# Patient Record
Sex: MALE | Race: BLACK OR AFRICAN AMERICAN | NOT HISPANIC OR LATINO | Employment: UNEMPLOYED | URBAN - METROPOLITAN AREA
[De-identification: names, ages, dates, MRNs, and addresses within clinical notes are randomized per-mention and may not be internally consistent; named-entity substitution may affect disease eponyms.]

---

## 2018-03-02 PROCEDURE — 99283 EMERGENCY DEPT VISIT LOW MDM: CPT

## 2018-03-03 ENCOUNTER — HOSPITAL ENCOUNTER (EMERGENCY)
Facility: OTHER | Age: 38
Discharge: HOME | End: 2018-03-03
Attending: INTERNAL MEDICINE
Payer: MEDICAID

## 2018-03-03 VITALS
BODY MASS INDEX: 21 KG/M2 | RESPIRATION RATE: 16 BRPM | TEMPERATURE: 99 F | SYSTOLIC BLOOD PRESSURE: 112 MMHG | DIASTOLIC BLOOD PRESSURE: 68 MMHG | HEART RATE: 59 BPM | HEIGHT: 71 IN | OXYGEN SATURATION: 100 % | WEIGHT: 150 LBS

## 2018-03-03 DIAGNOSIS — M79.641 RIGHT HAND PAIN: Primary | ICD-10-CM

## 2018-03-03 DIAGNOSIS — M79.643 HAND PAIN: ICD-10-CM

## 2018-03-03 RX ORDER — IBUPROFEN 800 MG/1
800 TABLET ORAL EVERY 8 HOURS PRN
Qty: 30 TABLET | Refills: 0 | Status: SHIPPED | OUTPATIENT
Start: 2018-03-03

## 2018-03-03 NOTE — ED PROVIDER NOTES
"Encounter Date: 3/2/2018       History     Chief Complaint   Patient presents with    Hand Pain     "i think i have a chemical infection in my hand" pt aao3, rr even unalbored skin wdp cap refill <2 sec. pt c/o pain to right hand after using tire/rim , unsure of how long pain to hand. no open wounds or abrasions.     38-year-old male presents to the emergency department complaining of right hand pain since using a tire  yesterday.  He states the tire  caused his right hand to become tender.  Denies fevers/chills, nausea/vomiting.      The history is provided by the patient. No  was used.   Hand Injury    The incident occurred yesterday. The incident occurred at home. The pain is present in the right hand. The quality of the pain is described as burning. The pain is at a severity of 2/10. The pain has been fluctuating since the incident. Pertinent negatives include no fever. He reports no foreign bodies present. The symptoms are aggravated by palpation. He has tried nothing for the symptoms.     Review of patient's allergies indicates:  No Known Allergies  History reviewed. No pertinent past medical history.  History reviewed. No pertinent surgical history.  History reviewed. No pertinent family history.  Social History   Substance Use Topics    Smoking status: Current Every Day Smoker     Packs/day: 0.50     Types: Cigarettes    Smokeless tobacco: Not on file    Alcohol use Yes      Comment: occassional     Review of Systems   Constitutional: Negative for fever.   Musculoskeletal:        Right hand pain   All other systems reviewed and are negative.      Physical Exam     Initial Vitals [03/02/18 2350]   BP Pulse Resp Temp SpO2   118/77 60 16 98.7 °F (37.1 °C) 99 %      MAP       90.67         Physical Exam    Nursing note and vitals reviewed.  Constitutional: He appears well-developed and well-nourished. No distress.   HENT:   Head: Normocephalic and atraumatic.   Eyes: " EOM are normal.   Neck: Normal range of motion.   Cardiovascular: Normal rate, regular rhythm and intact distal pulses.   Pulmonary/Chest: Breath sounds normal.   Abdominal: He exhibits no distension.   Musculoskeletal: He exhibits no edema.   Neurological: He is alert.   Skin: Skin is warm and dry. No rash noted. No erythema.   Right hand reveals no signs of erythema or edema.  Slight tenderness to palpation without gross deformity.  Neurovascularly intact   Psychiatric: He has a normal mood and affect.         ED Course   Procedures  Labs Reviewed - No data to display          Medical Decision Making:   Initial Assessment:   38-year-old male presents to the emergency department complaining of right hand pain since using a tire  yesterday.  He states the tire  caused his right hand to become tender.  Denies fevers/chills, nausea/vomiting.    ED Management:  Patient was given instructions for right hand pain secondary to chemical exposure.  Physical exam revealed no visible signs of dermatitis, cellulitis or abscess formation.  Patient was given a prescription for ibuprofen and advised to follow-up with his primary care physician within the next 3 days for reevaluation.                      Clinical Impression:   The primary encounter diagnosis was Right hand pain. A diagnosis of Hand pain was also pertinent to this visit.    Disposition:   Disposition: Discharged  Condition: Stable                        Romulo Baez MD  03/03/18 0040

## 2019-02-04 ENCOUNTER — HOSPITAL ENCOUNTER (EMERGENCY)
Facility: HOSPITAL | Age: 39
Discharge: HOME | End: 2019-02-04
Attending: EMERGENCY MEDICINE
Payer: MEDICAID

## 2019-02-04 VITALS
DIASTOLIC BLOOD PRESSURE: 74 MMHG | OXYGEN SATURATION: 100 % | BODY MASS INDEX: 22.4 KG/M2 | HEIGHT: 71 IN | TEMPERATURE: 98 F | RESPIRATION RATE: 18 BRPM | SYSTOLIC BLOOD PRESSURE: 126 MMHG | WEIGHT: 160 LBS | HEART RATE: 69 BPM

## 2019-02-04 DIAGNOSIS — R30.0 DYSURIA: Primary | ICD-10-CM

## 2019-02-04 LAB
BILIRUBIN, POC UA: NEGATIVE
BLOOD, POC UA: NEGATIVE
CLARITY, POC UA: CLEAR
COLOR, POC UA: YELLOW
GLUCOSE, POC UA: NEGATIVE
KETONES, POC UA: NEGATIVE
LEUKOCYTE EST, POC UA: NEGATIVE
NITRITE, POC UA: NEGATIVE
PH UR STRIP: 7 [PH]
PROTEIN, POC UA: ABNORMAL
SPECIFIC GRAVITY, POC UA: 1.02
UROBILINOGEN, POC UA: 4 E.U./DL

## 2019-02-04 PROCEDURE — 99284 EMERGENCY DEPT VISIT MOD MDM: CPT | Mod: 25,ER

## 2019-02-04 PROCEDURE — 81003 URINALYSIS AUTO W/O SCOPE: CPT | Mod: ER

## 2019-02-04 PROCEDURE — 63600175 PHARM REV CODE 636 W HCPCS: Mod: ER | Performed by: EMERGENCY MEDICINE

## 2019-02-04 PROCEDURE — 25000003 PHARM REV CODE 250: Mod: ER | Performed by: EMERGENCY MEDICINE

## 2019-02-04 PROCEDURE — 96372 THER/PROPH/DIAG INJ SC/IM: CPT | Mod: ER

## 2019-02-04 PROCEDURE — 87491 CHLMYD TRACH DNA AMP PROBE: CPT

## 2019-02-04 RX ORDER — CEFTRIAXONE 250 MG/1
250 INJECTION, POWDER, FOR SOLUTION INTRAMUSCULAR; INTRAVENOUS
Status: COMPLETED | OUTPATIENT
Start: 2019-02-04 | End: 2019-02-04

## 2019-02-04 RX ORDER — AZITHROMYCIN 250 MG/1
1000 TABLET, FILM COATED ORAL
Status: COMPLETED | OUTPATIENT
Start: 2019-02-04 | End: 2019-02-04

## 2019-02-04 RX ORDER — PHENAZOPYRIDINE HYDROCHLORIDE 200 MG/1
200 TABLET, FILM COATED ORAL 3 TIMES DAILY PRN
Qty: 6 TABLET | Refills: 0 | Status: SHIPPED | OUTPATIENT
Start: 2019-02-04 | End: 2019-02-06

## 2019-02-04 RX ADMIN — CEFTRIAXONE SODIUM 250 MG: 250 INJECTION, POWDER, FOR SOLUTION INTRAMUSCULAR; INTRAVENOUS at 03:02

## 2019-02-04 RX ADMIN — AZITHROMYCIN 1000 MG: 250 TABLET, FILM COATED ORAL at 03:02

## 2019-02-04 NOTE — DISCHARGE INSTRUCTIONS
Cultures pending for gonorrhea and Chlamydia.  Please follow up with primary care for further STD evaluation to include HIV, syphilis, and herpes.

## 2019-02-04 NOTE — ED PROVIDER NOTES
Encounter Date: 2/4/2019    SCRIBE #1 NOTE: I, Milagros Garcia, am scribing for, and in the presence of,  Dr. Curiel. I have scribed the following portions of the note - Other sections scribed: HPI,ROS,PE .       History     Chief Complaint   Patient presents with    Urinary Tract Infection     pt reports burning on urination for 1 day, denies discharge     37 y/o/m presents with mild dysuria 2 days ago. Pain getting worse.  No penile discharge, fever, chills, back pain. He admits to having unprotected intercourse last week with a female he does not know.      The history is provided by the patient. No  was used.     Review of patient's allergies indicates:  No Known Allergies  History reviewed. No pertinent past medical history.  History reviewed. No pertinent surgical history.  History reviewed. No pertinent family history.  Social History     Tobacco Use    Smoking status: Current Every Day Smoker     Packs/day: 0.50     Types: Cigarettes   Substance Use Topics    Alcohol use: Yes     Comment: occassional    Drug use: No     Review of Systems   Constitutional: Negative for fever.   HENT: Negative for sore throat.    Respiratory: Negative for shortness of breath.    Cardiovascular: Negative for chest pain.   Gastrointestinal: Negative for nausea.   Genitourinary: Positive for dysuria. Negative for discharge, flank pain and penile pain.   Musculoskeletal: Negative for back pain.   Skin: Negative for rash.   Neurological: Negative for weakness.   Hematological: Does not bruise/bleed easily.   All other systems reviewed and are negative.      Physical Exam     Initial Vitals [02/04/19 1419]   BP Pulse Resp Temp SpO2   126/74 69 18 98.2 °F (36.8 °C) 100 %      MAP       --         Physical Exam    Nursing note and vitals reviewed.  Constitutional: He appears well-developed and well-nourished.   HENT:   Head: Normocephalic and atraumatic.   Right Ear: External ear normal.   Left Ear: External  ear normal.   Nose: Nose normal.   Mouth/Throat: Oropharynx is clear and moist.   Eyes: Conjunctivae and EOM are normal. Pupils are equal, round, and reactive to light.   Neck: Normal range of motion. Neck supple.   Cardiovascular: Normal rate, regular rhythm, normal heart sounds and intact distal pulses. Exam reveals no gallop and no friction rub.    No murmur heard.  Pulmonary/Chest: Breath sounds normal. No stridor. No respiratory distress. He has no wheezes. He has no rhonchi. He has no rales. He exhibits no tenderness.   Abdominal: Soft. Bowel sounds are normal. He exhibits no distension and no mass. There is no tenderness. There is no rebound and no guarding.   Genitourinary: Right testis shows no swelling and no tenderness. Left testis shows no swelling and no tenderness. Circumcised. No penile tenderness. No discharge found.   Musculoskeletal: Normal range of motion.   Neurological: He is alert and oriented to person, place, and time. No cranial nerve deficit or sensory deficit. GCS score is 15. GCS eye subscore is 4. GCS verbal subscore is 5. GCS motor subscore is 6.   Skin: Skin is warm and dry. Capillary refill takes less than 2 seconds. No rash noted.   Psychiatric: He has a normal mood and affect. His behavior is normal.       Patient gave consent to have physical exam performed.      ED Course   Procedures  Labs Reviewed   POCT URINALYSIS W/O SCOPE - Abnormal; Notable for the following components:       Result Value    Glucose, UA Negative (*)     Bilirubin, UA Negative (*)     Ketones, UA Negative (*)     Blood, UA Negative (*)     Protein, UA 1+ (*)     Urobilinogen, UA 4.0 (*)     Nitrite, UA Negative (*)     Leukocytes, UA Negative (*)     All other components within normal limits   C. TRACHOMATIS/N. GONORRHOEAE BY AMP DNA   POCT URINALYSIS W/O SCOPE          Imaging Results    None          Medical Decision Making:   Clinical Tests:   Lab Tests: Ordered and Reviewed  ED Management:  Ordering, UA,  ceftriaxone and azithromycin.   Treatment in the ED Physical Exam, ceftriaxone and azithromycin given.  Patient reports total resolution of symptoms after medication.    Discussed labs, and imaging results.    Fill and take prescriptions as directed.  Return to the ED if symptoms worsen or do not resolve.   Answered questions and discussed discharge plan.    Patient feels much better and is ready for discharge.  Follow up with PCP in 1 days.            Scribe Attestation:   Scribe #1: I performed the above scribed service and the documentation accurately describes the services I performed. I attest to the accuracy of the note.     I, Dr. Linda Curiel, personally performed the services described in this documentation. This document was produced by a scribe under my direction and in my presence. All medical record entries made by the scribe were at my direction and in my presence.  I have reviewed the chart and agree that the record reflects my personal performance and is accurate and complete. Linda Curiel DO.     02/04/2019 8:46 PM             Clinical Impression:     1. Dysuria                                   Linda Curiel DO  02/04/19 2047

## 2019-02-06 LAB
C TRACH DNA SPEC QL NAA+PROBE: NOT DETECTED
N GONORRHOEA DNA SPEC QL NAA+PROBE: NOT DETECTED

## 2019-10-16 ENCOUNTER — HOSPITAL ENCOUNTER (EMERGENCY)
Facility: HOSPITAL | Age: 39
Discharge: HOME | End: 2019-10-16
Attending: EMERGENCY MEDICINE
Payer: MEDICAID

## 2019-10-16 VITALS
BODY MASS INDEX: 21.98 KG/M2 | HEIGHT: 71 IN | DIASTOLIC BLOOD PRESSURE: 80 MMHG | TEMPERATURE: 99 F | HEART RATE: 72 BPM | OXYGEN SATURATION: 100 % | WEIGHT: 157 LBS | SYSTOLIC BLOOD PRESSURE: 125 MMHG | RESPIRATION RATE: 20 BRPM

## 2019-10-16 DIAGNOSIS — L30.9 DERMATITIS: Primary | ICD-10-CM

## 2019-10-16 PROCEDURE — 99282 EMERGENCY DEPT VISIT SF MDM: CPT | Mod: ER

## 2019-10-16 NOTE — ED PROVIDER NOTES
Encounter Date: 10/16/2019    SCRIBE #1 NOTE: I, Marietta Gomez, am scribing for, and in the presence of,  LEW Hurt. I have scribed the following portions of the note - Other sections scribed: HPI, ROS, PE.       History     Chief Complaint   Patient presents with    Rash     Generalized body rash with itching for 2 years     Doron Douglas is a 39 y.o. male who presents to the ED complaining of a rash that appeared about a year ago.  Denies any difference since rash started. Came to ED to get checked for HIV. Reports rash is on arms and back and itching. Creams with fragrance makes the rash flare up. Has been given eczema cream, states it helps for a week, then rash comes back.     The history is provided by the patient. No  was used.   Rash    This is a chronic problem. Illness onset: two years  The problem is associated with nothing. The rash is present on the back, right arm and left arm. The pain is at a severity of 0/10. Associated symptoms include itching. He has tried anti-itch cream and steriods for the symptoms.     Review of patient's allergies indicates:  No Known Allergies  History reviewed. No pertinent past medical history.  History reviewed. No pertinent surgical history.  History reviewed. No pertinent family history.  Social History     Tobacco Use    Smoking status: Current Every Day Smoker     Packs/day: 0.50     Types: Cigarettes   Substance Use Topics    Alcohol use: Yes     Comment: occassional    Drug use: No     Review of Systems   Constitutional: Negative.    HENT: Negative.    Eyes: Negative.    Respiratory: Negative.  Negative for shortness of breath.    Cardiovascular: Negative.  Negative for chest pain.   Gastrointestinal: Negative.    Endocrine: Negative.    Genitourinary: Negative.    Musculoskeletal: Negative.    Skin: Positive for itching and rash (on arms and back).   Allergic/Immunologic: Negative.    Neurological: Negative.    Hematological:  Negative.    Psychiatric/Behavioral: Negative.    All other systems reviewed and are negative.      Physical Exam     Initial Vitals [10/16/19 1411]   BP Pulse Resp Temp SpO2   125/80 72 20 98.7 °F (37.1 °C) 100 %      MAP       --         Physical Exam    Nursing note and vitals reviewed.  Constitutional: He appears well-developed.   HENT:   Right Ear: External ear normal.   Left Ear: External ear normal.   Nose: Nose normal.   Mouth/Throat: Oropharynx is clear and moist.   Eyes: Conjunctivae are normal.   Neck: Normal range of motion. Neck supple.   Cardiovascular: Normal rate, regular rhythm, normal heart sounds and intact distal pulses.   No murmur heard.  Pulmonary/Chest: Effort normal and breath sounds normal.   Abdominal: Soft. Bowel sounds are normal.   Musculoskeletal: Normal range of motion. He exhibits no edema or tenderness.   Neurological: He is alert and oriented to person, place, and time.   Skin: Skin is warm and dry. Rash (eczema-like rash on arms and upper back) noted.   Psychiatric: He has a normal mood and affect. His behavior is normal.         ED Course   Procedures  Labs Reviewed - No data to display          Medical Decision Making:   History:   Old Medical Records: I decided to obtain old medical records.  Initial Assessment:   39 y.o male with a rash on arms and back that occurred over a year ago. Patient is concerned for HIV. He denies fever or chills.   Differential Diagnosis:   Rash, eczema  ED Management:  Referred to dermatologist and [facility].  Return to ED for worsening of symptoms.             Scribe Attestation:   Scribe #1: I performed the above scribed service and the documentation accurately describes the services I performed. I attest to the accuracy of the note.    This document was produced by a scribe under my direction and in my presence. I agree with the content of the note and have made any necessary edits.     LEW Hurt    10/16/2019 10:43 PM            Clinical Impression:     1. Dermatitis                                   Susanne Latham, LEW  10/16/19 2243       Susanne Latham, LEW  10/16/19 2241

## 2024-05-06 ENCOUNTER — HOSPITAL ENCOUNTER (EMERGENCY)
Facility: HOSPITAL | Age: 44
Discharge: HOME | End: 2024-05-06
Attending: EMERGENCY MEDICINE
Payer: MEDICAID

## 2024-05-06 VITALS
RESPIRATION RATE: 20 BRPM | HEIGHT: 72 IN | TEMPERATURE: 98 F | HEART RATE: 73 BPM | WEIGHT: 155 LBS | BODY MASS INDEX: 20.99 KG/M2 | SYSTOLIC BLOOD PRESSURE: 116 MMHG | DIASTOLIC BLOOD PRESSURE: 70 MMHG | OXYGEN SATURATION: 99 %

## 2024-05-06 DIAGNOSIS — M25.519 SHOULDER PAIN: ICD-10-CM

## 2024-05-06 DIAGNOSIS — S52.102A CLOSED FRACTURE OF PROXIMAL END OF LEFT RADIUS, UNSPECIFIED FRACTURE MORPHOLOGY, INITIAL ENCOUNTER: Primary | ICD-10-CM

## 2024-05-06 DIAGNOSIS — M79.603 ARM PAIN: ICD-10-CM

## 2024-05-06 DIAGNOSIS — M25.539 WRIST PAIN: ICD-10-CM

## 2024-05-06 DIAGNOSIS — M25.529 ELBOW PAIN: ICD-10-CM

## 2024-05-06 DIAGNOSIS — M89.8X1 CLAVICLE PAIN: ICD-10-CM

## 2024-05-06 PROCEDURE — 63600175 PHARM REV CODE 636 W HCPCS: Performed by: PHYSICIAN ASSISTANT

## 2024-05-06 PROCEDURE — 29105 APPLICATION LONG ARM SPLINT: CPT | Mod: LT

## 2024-05-06 PROCEDURE — 96372 THER/PROPH/DIAG INJ SC/IM: CPT | Mod: 59 | Performed by: PHYSICIAN ASSISTANT

## 2024-05-06 PROCEDURE — 99284 EMERGENCY DEPT VISIT MOD MDM: CPT | Mod: 25

## 2024-05-06 RX ORDER — KETOROLAC TROMETHAMINE 30 MG/ML
30 INJECTION, SOLUTION INTRAMUSCULAR; INTRAVENOUS
Status: COMPLETED | OUTPATIENT
Start: 2024-05-06 | End: 2024-05-06

## 2024-05-06 RX ORDER — HYDROCODONE BITARTRATE AND ACETAMINOPHEN 7.5; 325 MG/1; MG/1
1 TABLET ORAL EVERY 6 HOURS PRN
Qty: 12 TABLET | Refills: 0 | Status: SHIPPED | OUTPATIENT
Start: 2024-05-06

## 2024-05-06 RX ADMIN — KETOROLAC TROMETHAMINE 30 MG: 30 INJECTION, SOLUTION INTRAMUSCULAR at 12:05

## 2024-05-06 NOTE — DISCHARGE INSTRUCTIONS
Please take the prescribed medications according to the directions on the bottle.  Wear the splint provided until you meet with the orthopedic doctor.  Call the orthopedic doctor listed below today or tomorrow morning to schedule an urgent follow up appointment in their clinic to further discuss your fracture.  If your symptoms worsen you may return to the ED for reevaluation.

## 2024-05-06 NOTE — ED PROVIDER NOTES
Encounter Date: 5/6/2024    SCRIBE #1 NOTE: I, Laurel Murphy, am scribing for, and in the presence of,  Luh Carrillo PA-C. I have scribed the following portions of the note - Other sections scribed: HPI, ROS, PE.       History     Chief Complaint   Patient presents with    Shoulder Injury     Reports running from 2 dgs on Saturday and tripping over a tree stump and landing face first intro another tree stump. Reports lip pain, L shoulder, elbow, and wrist pain. Denies loc or blood thinners. Reports taking 2 tylenol 2 hours pta. Arrives with homemade splint.      Doron Douglas is a 44 y.o. male, with no known past medical history, who presents to the ED with a left arm injury that occurred 2 days ago. Patient reports he was running from two dogs when he tripped on tree roots and fell, landing on his left arm. Patient reports pain to left shoulder, elbow, and wrist. Patient reports taking Tylenol (last dose 2 hours ago) and Aleve with minimal relief. Patient states he is unable to extend his left arm. Patient arrived with homemade splint. Patient denies loss of consciousness, vision changes, vomiting, or other associated symptoms. Patient denies blood thinners. NKDA.     The history is provided by the patient. No  was used.     Review of patient's allergies indicates:  No Known Allergies  No past medical history on file.  No past surgical history on file.  No family history on file.  Social History     Tobacco Use    Smoking status: Every Day     Current packs/day: 0.50     Types: Cigarettes   Substance Use Topics    Alcohol use: Yes     Comment: occassional    Drug use: No     Review of Systems   Constitutional:  Negative for chills, fatigue and fever.   HENT:  Negative for congestion, sinus pressure, sinus pain, sneezing and sore throat.    Eyes:  Negative for visual disturbance.   Respiratory:  Negative for cough and shortness of breath.    Cardiovascular:  Negative for chest pain.    Gastrointestinal:  Negative for abdominal pain, nausea and vomiting.   Genitourinary:  Negative for difficulty urinating.   Musculoskeletal:  Positive for arthralgias (left shoulder, elbow, and wrist). Negative for back pain.   Skin:  Negative for rash.   Neurological:  Negative for syncope and headaches.       Physical Exam     Initial Vitals [05/06/24 1034]   BP Pulse Resp Temp SpO2   116/70 73 20 98.1 °F (36.7 °C) 99 %      MAP       --         Physical Exam    Nursing note and vitals reviewed.  Constitutional: He appears well-developed and well-nourished. He is not diaphoretic. No distress.   HENT:   Head: Normocephalic and atraumatic.   Right Ear: External ear normal.   Left Ear: External ear normal.   Cardiovascular:  Normal rate, regular rhythm, intact distal pulses and normal pulses.           Pulmonary/Chest: Effort normal and breath sounds normal. No respiratory distress.   Abdominal: Abdomen is soft. Bowel sounds are normal. He exhibits no distension. There is no abdominal tenderness.   Musculoskeletal:         General: No edema.      Left shoulder: Normal range of motion.      Left elbow: Decreased range of motion. Tenderness present.      Comments: Normal cap refill, sensation, and pulse of left arm, no lacerations.      Neurological: He is alert and oriented to person, place, and time. GCS score is 15. GCS eye subscore is 4. GCS verbal subscore is 5. GCS motor subscore is 6.   Skin: Skin is warm and dry. No laceration noted.   Psychiatric: He has a normal mood and affect. His behavior is normal. Judgment normal.         ED Course   Splint Application    Date/Time: 5/6/2024 12:34 PM    Performed by: Luh Carrillo PA-C  Authorized by: Jurgen Viveros MD  Location details: left elbow  Splint type: long arm  Post-procedure: The splinted body part was neurovascularly unchanged following the procedure.  Patient tolerance: Patient tolerated the procedure well with no immediate  complications        Labs Reviewed - No data to display       Imaging Results              X-Ray Shoulder Trauma Left (Final result)  Result time 05/06/24 11:17:18      Final result by Hollie Lawton MD (05/06/24 11:17:18)                   Impression:      No acute fracture.      Electronically signed by: Hollie Lawton MD  Date:    05/06/2024  Time:    11:17               Narrative:    EXAMINATION:  XR SHOULDER TRAUMA 3 VIEW LEFT    CLINICAL HISTORY:  Pain in unspecified shoulder    TECHNIQUE:  Three views of the left shoulder were performed.    COMPARISON  None    FINDINGS:  There is no acute fracture, dislocation, or bone destruction identified.  There is mild degenerative change of the acromioclavicular joint.                                       X-Ray Clavicle Left (Final result)  Result time 05/06/24 11:17:50      Final result by Hollie Lawton MD (05/06/24 11:17:50)                   Impression:      No acute fracture.      Electronically signed by: Hollie Lawton MD  Date:    05/06/2024  Time:    11:17               Narrative:    EXAMINATION:  XR CLAVICLE LEFT    CLINICAL HISTORY:  Other specified disorders of bone, shoulder    TECHNIQUE:  Two views of the left clavicle were obtained.    COMPARISON:  None    FINDINGS:  There is no acute fracture, dislocation, or bone destruction.  There is mild hypertrophic degenerative change of the acromioclavicular joint.                                        X-Ray Elbow Complete Left (Final result)  Result time 05/06/24 11:18:49      Final result by Hollie Lawton MD (05/06/24 11:18:49)                   Impression:      Acute mildly displaced fracture of the proximal radius extending to the articular surface associated with elbow joint effusion.    This report was flagged in Epic as abnormal.      Electronically signed by: Hollie Lawton MD  Date:    05/06/2024  Time:    11:18               Narrative:    EXAMINATION:  XR ELBOW COMPLETE 3  VIEW LEFT    CLINICAL HISTORY:  Pain in unspecified elbow    TECHNIQUE:  AP, lateral, and oblique views of the left elbow were performed.    COMPARISON:  None    FINDINGS:  There is an acute mildly displaced fracture of the radial neck and head with extension to the articular surface.  This is associated with a and elbow joint effusion.                                       X-Ray Forearm Left (Final result)  Result time 05/06/24 11:19:43      Final result by Hollie Lawton MD (05/06/24 11:19:43)                   Impression:      See above.      Electronically signed by: Hollie Lawton MD  Date:    05/06/2024  Time:    11:19               Narrative:    EXAMINATION:  XR FOREARM LEFT    CLINICAL HISTORY:  Pain in arm, unspecified    TECHNIQUE:  AP and lateral views of the left forearm were performed.    COMPARISON:  None    FINDINGS:  Acute fracture of the proximal radius is partially visualized on lateral view, better seen on dedicated elbow radiographs.  There is an elbow joint effusion.  No additional fractures are seen.                                       X-Ray Wrist Complete Left (Final result)  Result time 05/06/24 11:20:13      Final result by Hollie Lawton MD (05/06/24 11:20:13)                   Impression:      No evidence of acute traumatic injury.      Electronically signed by: Hollie Lawton MD  Date:    05/06/2024  Time:    11:20               Narrative:    EXAMINATION:  XR WRIST COMPLETE 3 VIEWS LEFT    CLINICAL HISTORY:  Pain in unspecified wrist    TECHNIQUE:  PA, lateral, and oblique views of the left wrist were performed.    COMPARISON:  None    FINDINGS:  There is no acute fracture, dislocation, or bone destruction.  There is moderate degenerative change of the 1st carpometacarpal joint.                                       Medications   ketorolac injection 30 mg (30 mg Intramuscular Given 5/6/24 1210)     Medical Decision Making  Doron Douglas is a 44 y.o. male, with no  known past medical history, who presents to the ED with a left arm injury that occurred 2 days ago. Patient reports he was running from two dogs when he tripped on tree roots and fell, landing on his left arm. Patient reports pain to left shoulder, elbow, and wrist. Patient reports taking Tylenol (last dose 2 hours ago) and Aleve with minimal relief. Patient states he is unable to extend his left arm. Patient arrived with homemade splint. Patient denies loss of consciousness, vision changes, vomiting, or other associated symptoms. Patient denies blood thinners. NKDA. On exam there is tenderness and decreased range of motion to the left elbow.  Normal radial pulse on the left wrist.  Normal capillary refill of the left hand.  Sensation intact.  Normal range of motion of the left shoulder.  No tenderness in the left hand or wrist.  X-ray of the left wrist is positive for acute fracture of the proximal radius.  X-ray of the left shoulder, clavicle, wrist and forearm is otherwise negative for acute injury.  Reviewed case with my attending, Dr. Viveros, who recommends long arm posterior splint and referral to Orthopedics.  Splint applied in the ED and patient tolerated procedure well.  Pain medications sent to pharmacy.  Urgent referral to orthopedics placed.  Patient advised to wear splint until he meets with the orthopedic doctor.    Of note: Differential diagnosis to include but not limited to:  Fracture, dislocation, effusion, strain, sprain    Luh Carrillo PA-C    DISCLAIMER: This note was prepared with [employer] voice recognition transcription software. Garbled syntax, mangled pronouns, and other bizarre constructions may be attributed to that software system. If you have any questions regarding information in this note please contact me.         Risk  Prescription drug management.            Scribe Attestation:   Scribe #1: I performed the above scribed service and the documentation accurately describes the  services I performed. I attest to the accuracy of the note.                             I, Luh Carrillo, personally performed the services described in this documentation.  All medical record entries made by the scribe were at my direction and in my presence.  I have reviewed the chart and agree that the record reflects my personal performance and is accurate and complete.    Clinical Impression:  Final diagnoses:  [M25.519] Shoulder pain  [M89.8X1] Clavicle pain  [M25.529] Elbow pain  [M79.603] Arm pain  [M25.539] Wrist pain  [S52.102A] Closed fracture of proximal end of left radius, unspecified fracture morphology, initial encounter (Primary)          ED Disposition Condition    Discharge Stable          ED Prescriptions       Medication Sig Dispense Start Date End Date Auth. Provider    HYDROcodone-acetaminophen (NORCO) 7.5-325 mg per tablet Take 1 tablet by mouth every 6 (six) hours as needed for Pain. 12 tablet 5/6/2024 -- Luh Carrillo PA-C          Follow-up Information       Follow up With Specialties Details Why Contact Info    Christina Rodgers MD Orthopedic Surgery Call today For follow up [address] [facility] [address]  [phone]      [facility] Emergency Dept Emergency Medicine Go to  As needed, If symptoms worsen [address] [facility] [place] [id number] [phone]             Luh Carrillo PA-C  05/06/24 2191

## 2025-01-01 ENCOUNTER — HOSPITAL ENCOUNTER (EMERGENCY)
Facility: HOSPITAL | Age: 45
Discharge: HOME | End: 2025-01-01
Attending: EMERGENCY MEDICINE
Payer: MEDICAID

## 2025-01-01 VITALS
RESPIRATION RATE: 20 BRPM | TEMPERATURE: 99 F | SYSTOLIC BLOOD PRESSURE: 141 MMHG | OXYGEN SATURATION: 99 % | HEART RATE: 68 BPM | DIASTOLIC BLOOD PRESSURE: 89 MMHG

## 2025-01-01 DIAGNOSIS — K05.10 GINGIVITIS: Primary | ICD-10-CM

## 2025-01-01 DIAGNOSIS — K02.9 DENTAL CARIES: ICD-10-CM

## 2025-01-01 PROCEDURE — 99284 EMERGENCY DEPT VISIT MOD MDM: CPT

## 2025-01-01 RX ORDER — CHLORHEXIDINE GLUCONATE ORAL RINSE 1.2 MG/ML
15 SOLUTION DENTAL 2 TIMES DAILY
Qty: 420 ML | Refills: 0 | Status: SHIPPED | OUTPATIENT
Start: 2025-01-01 | End: 2025-01-15

## 2025-01-01 RX ORDER — AMOXICILLIN AND CLAVULANATE POTASSIUM 875; 125 MG/1; MG/1
1 TABLET, FILM COATED ORAL 2 TIMES DAILY
Qty: 28 TABLET | Refills: 0 | Status: SHIPPED | OUTPATIENT
Start: 2025-01-01 | End: 2025-01-15

## 2025-01-01 RX ORDER — HYDROCODONE BITARTRATE AND ACETAMINOPHEN 5; 325 MG/1; MG/1
1 TABLET ORAL EVERY 6 HOURS PRN
Qty: 12 TABLET | Refills: 0 | Status: SHIPPED | OUTPATIENT
Start: 2025-01-01

## 2025-01-01 RX ORDER — NAPROXEN 500 MG/1
500 TABLET ORAL 2 TIMES DAILY
Qty: 30 TABLET | Refills: 0 | Status: SHIPPED | OUTPATIENT
Start: 2025-01-01

## 2025-01-01 NOTE — ED PROVIDER NOTES
Encounter Date: 1/1/2025       History     Chief Complaint   Patient presents with    Dental Pain     Left upper dental pain      The history is provided by the patient and medical records.   44-year-old male no pertinent past medical history presenting to the emergency department today for evaluation of left upper dental pain for the past several days.  States the pain is more in his gum line reports some swelling to the gum.  It was not taken any medication for his symptoms.  States he was not seen a dentist in several years.  No other complaints at this time.  Denies any fever, headache, dizziness, sore throat, neck pain, difficulty swallowing or other associated symptoms.  Review of patient's allergies indicates:  No Known Allergies  No past medical history on file.  No past surgical history on file.  No family history on file.  Social History     Tobacco Use    Smoking status: Every Day     Current packs/day: 0.50     Types: Cigarettes   Substance Use Topics    Alcohol use: Yes     Comment: occassional    Drug use: No     Review of Systems   Constitutional:  Negative for chills, diaphoresis, fatigue and fever.   HENT:  Positive for dental problem. Negative for congestion, ear discharge, ear pain, rhinorrhea, sore throat and trouble swallowing.    Eyes:  Negative for photophobia, redness and visual disturbance.   Respiratory:  Negative for cough and shortness of breath.    Cardiovascular:  Negative for chest pain, palpitations and leg swelling.   Gastrointestinal:  Negative for abdominal pain, diarrhea, nausea and vomiting.   Genitourinary:  Negative for difficulty urinating, dysuria, flank pain, frequency and hematuria.   Musculoskeletal:  Negative for arthralgias, back pain, myalgias, neck pain and neck stiffness.   Skin:  Negative for pallor and rash.   Neurological:  Negative for dizziness, weakness, light-headedness, numbness and headaches.   Hematological:  Does not bruise/bleed easily.       Physical Exam      Initial Vitals [01/01/25 1426]   BP Pulse Resp Temp SpO2   (!) 141/89 68 20 98.7 °F (37.1 °C) 99 %      MAP       --         Physical Exam    Nursing note and vitals reviewed.  Constitutional: He appears well-developed and well-nourished. He is not diaphoretic. He does not appear ill. No distress.   HENT:   Head: Normocephalic and atraumatic.   Right Ear: External ear normal.   Left Ear: External ear normal.   Nose: Nose normal. Mouth/Throat: Uvula is midline, oropharynx is clear and moist and mucous membranes are normal. He does not have dentures. No trismus in the jaw. Abnormal dentition. Dental caries (Multiple generalized poor dentition.) present. No dental abscesses or uvula swelling.   Uvula midline.  No trismus.  No uvular oropharyngeal submandibular sublingual erythema or swelling.  Tongue midline.  Tolerating oral secretions.  Speaking complete sentences without muffled or hot potato voice.  Generalized poor dentition with multiple dental caries.  Gingivitis.  No dental abscess.   Eyes: Conjunctivae and EOM are normal. Pupils are equal, round, and reactive to light. Right eye exhibits no discharge. Left eye exhibits no discharge. No scleral icterus.   Neck: Trachea normal. Neck supple.   Normal range of motion.   Full passive range of motion without pain.     Cardiovascular:  Normal rate, regular rhythm, normal heart sounds, intact distal pulses and normal pulses.     Exam reveals no distant heart sounds and no friction rub.       Pulmonary/Chest: Effort normal and breath sounds normal. No respiratory distress.   Abdominal: Abdomen is soft. Bowel sounds are normal. He exhibits no distension and no pulsatile midline mass. There is no abdominal tenderness.   No right CVA tenderness.  No left CVA tenderness. There is no rebound and no guarding.   Musculoskeletal:         General: Normal range of motion.      Right shoulder: Normal.      Left shoulder: Normal.      Right elbow: Normal.      Left elbow:  Normal.      Right wrist: Normal.      Left wrist: Normal.      Right hand: Normal.      Left hand: Normal.      Cervical back: Normal, full passive range of motion without pain, normal range of motion and neck supple.      Thoracic back: Normal.      Lumbar back: Normal.      Right hip: Normal.      Left hip: Normal.      Right knee: Normal.      Left knee: Normal.      Right lower leg: Normal.      Left lower leg: Normal.      Right ankle: Normal.      Left ankle: Normal.      Right foot: Normal.      Left foot: Normal.     Neurological: He is alert and oriented to person, place, and time. He has normal strength. No cranial nerve deficit or sensory deficit. Coordination and gait normal.   Skin: Skin is warm and dry. Capillary refill takes less than 2 seconds. No bruising, no ecchymosis and no rash noted. No erythema.   Psychiatric: He has a normal mood and affect. His speech is normal and behavior is normal. Thought content normal.         ED Course   Procedures  Labs Reviewed - No data to display       Imaging Results    None          Medications - No data to display  Medical Decision Making  44-year-old male no pertinent past medical history presenting to the emergency department today for evaluation of left upper dental pain for the past several days.  States the pain is more in his gum line reports some swelling to the gum.  It was not taken any medication for his symptoms.  States he was not seen a dentist in several years.  No other complaints at this time.  Denies any fever, headache, dizziness, sore throat, neck pain, difficulty swallowing or other associated symptoms.  Patient's chart and medical history reviewed.  Patient's vitals reviewed.  They are afebrile, no respiratory distress, nontoxic-appearing in the ED.  Differential diagnosis considered Gingivitis, Dental abscess, Fractured tooth, Facial cellulitis, Pulpitis, Peritonsillar abscess, Retropharyngeal abscess, Armando's angina , Dry Socket, Trench  mouth.   Physical exam as above.   do not suspect sepsis., do not suspect deep space infection with FROM of neck without pain or limitation, no oropharyngeal/submandibular/sublingual erythema/swelling, patient speaking in complete sentences without muffled/hot potato voice, tolerating oral secretions. , and findings consistent with dental caries without evidence of dental abscess.     At this time I'll discharge home to follow up with primary care physician in the next 1-2 days for further evaluation.  If the symptoms continue the pt will need to see Orthodontics for further evaluation.  The patient is comfortable with this plan and comfortable going home at this time. After taking into careful account the historical factors and physical exam findings of the patient's presentation today, in conjunction with the empirical and objective data obtained on ED workup, no acute emergent medical condition has been identified. The patient appears to be low risk for an emergent medical condition and I feel it is safe and appropriate at this time for the patient to be discharged to follow-up as detailed in their discharge instructions for reevaluation and possible continued outpatient workup and management. I have discussed the specifics of the workup with the patient and the patient has verbalized understanding of the details of the workup, the diagnosis, the treatment plan, and the need for outpatient follow-up.  Although the patient has no emergent etiology today this does not preclude the development of an emergent condition so in addition, I have advised the patient that they can return to the ED and/or activate EMS at any time with worsening of their symptoms, change of their symptoms, or with any other medical complaint.  The patient remained comfortable and stable during their visit in the ED.  Discharge and follow-up instructions discussed with the patient who expressed understanding and willingness to comply with my  recommendations. I discussed with the patient/family the diagnosis, treatment plan, indications for return to the emergency department, and for expected follow-up. Please follow up with your primary doctor in 1-2 days and return to the ED in any new, worsening, or continued symptoms. The patient/family verbalized an understanding. The patient/family is asked if there are any questions or concerns. We discuss the case, until all issues are addressed to the patient/family's satisfaction. Patient/family understands and is agreeable to the plan.      MANUEL PAUL PA-C.    DISCLAIMER: This note was prepared with [email or web address] voice recognition transcription software. Garbled syntax, mangled pronouns, and other bizarre constructions may be attributed to that software system.        Risk  Prescription drug management.                                      Clinical Impression:  Final diagnoses:  [K05.10] Gingivitis (Primary)  [K02.9] Dental caries          ED Disposition Condition    Discharge Stable          ED Prescriptions       Medication Sig Dispense Start Date End Date Auth. Provider    HYDROcodone-acetaminophen (NORCO) 5-325 mg per tablet Take 1 tablet by mouth every 6 (six) hours as needed. 12 tablet 1/1/2025 -- Manuel Paul PA-C    amoxicillin-clavulanate 875-125mg (AUGMENTIN) 875-125 mg per tablet Take 1 tablet by mouth 2 (two) times daily. for 14 days 28 tablet 1/1/2025 1/15/2025 Manuel Paul PA-C    chlorhexidine (PERIDEX) 0.12 % solution Use as directed 15 mLs in the mouth or throat 2 (two) times daily. for 14 days 420 mL 1/1/2025 1/15/2025 Manuel Paul PA-C    naproxen (NAPROSYN) 500 MG tablet Take 1 tablet (500 mg total) by mouth 2 (two) times daily. 30 tablet 1/1/2025 -- Manuel Paul PA-C          Follow-up Information       Follow up With Specialties Details Why Contact [facility] -  Schedule an appointment as soon as possible for a visit in 1 day for follow up if you do  not currently have a  [address] [place] [address]  [phone]      [facility] Emergency Dept Emergency Medicine Go to  If symptoms worsen [address]  [facility] [place] [id number] [phone]    Dentist  Schedule an appointment as soon as possible for a visit in 1 day for follow up              Manuel Mccullough PA-C  01/01/25 143

## 2025-01-01 NOTE — DISCHARGE INSTRUCTIONS
DENTAL RESOURCES      \[facility] [place] [facility]\"" School of Dentistry       [phone]     [facility] 8-4 Monday - Friday       [phone]     \[facility] [place] [facility]\"" Medically Compromised Patients       [phone]     \[facility] [place] [facility]\"" [facility] Pediatric [facility]                                 0-6 years                                                                                             7-13 years     [phone]     [facility]    [facility] for   Developmental Disability Care     [phone]     [facility]       [phone]     [facility]    [address], Mon-Fri not on Wed  8am-4pm    Over 60 years old living in [place]           [phone]     [facility]    [address] [place]     [phone]     [facility]       [phone]     [facility] HIV [facility]  [address]       [phone]     [facility]       [phone]     Thank you for coming to our Emergency Department today. It is important to remember that some problems or medical conditions are difficult to diagnose and may not be found or addressed during your Emergency Department visit.  These conditions often start with non-specific symptoms and can only be diagnosed on follow up visits with your primary care physician or specialist when the symptoms continue or change. Please remember that all medical conditions can change, and we cannot predict how you will be feeling tomorrow or the next day. Return to the ER with any questions/concerns, new/concerning symptoms including fever, chest pain, shortness of breath, loss of consciousness, dizziness, weakness, worsening symptoms, failure to improve, or any other concerns. Also, please follow up with your Primary Care Physician and/or Pediatrician in the next 1-2 days to review your ED visit in  entirety and for re-evaluation.   Be sure to follow up with your primary care doctor and review all labs/imaging/tests that were performed during your ER visit with them. It is very common for us to identify non-emergent incidental findings which must be followed up with your primary care physician.  Some labs/imaging/tests may be outside of the normal range, and require non-emergent follow-up and/or further investigation/treatment/procedures/testing to help diagnose/exclude/prevent complications or other potentially serious medical conditions. Some abnormalities may not have been discussed or addressed during your ER visit. Some lab results may not return during your ER visit but can be accessible by downloading the free [facility] My[facility] eloy or by visiting [email or web address] . It is important for you to review all labs/imaging/tests which are outside of the normal range with your physician.  An ER visit does not replace a primary care visit, and many screening tests or follow-up tests cannot be ordered by an ER doctor or performed by the ER. Some tests may even require pre-approval.  If you do not have a primary care doctor, you may contact the one listed on your discharge paperwork or you may also call the [facility] Appointment Desk at [phone] , or [facility] at  [phone] to schedule an appointment, or establish care with a primary care doctor or even a specialist and to obtain information about local resources. It is important to your health that you have a primary care doctor.  Please take all medications as directed. We have done our best to select a medication for you that will treat your condition however, all medications may potentially have side-effects and it is impossible to predict which medications may give you side-effects or what those side-effects (if any) those medications may give you.  If you feel that you are having a negative effect or side-effect of any medication you  should stop taking those medications immediately and seek medical attention. If you feel that you are having a life-threatening reaction call 911.  Do not drive, swim, climb to height, take a bath, operate heavy machinery, drink alcohol or take potentially sedating medications, sign any legal documents or make any important decisions for 24 hours if you have received any pain medications, sedatives or mood altering drugs during your ER visit or within 24 hours of taking them if they have been prescribed to you.   You can find additional resources for Dentists, hearing aids, durable medical equipment, low cost pharmacies and other resources at [email or web address]://[email or web address]  Patient agrees with this plan. Discussed with her strict return precautions, they verbalized understanding. Patient is stable for discharge.   § Please take all medication as prescribed.

## 2025-02-27 ENCOUNTER — HOSPITAL ENCOUNTER (EMERGENCY)
Facility: HOSPITAL | Age: 45
Discharge: HOME | End: 2025-02-27
Attending: STUDENT IN AN ORGANIZED HEALTH CARE EDUCATION/TRAINING PROGRAM
Payer: MEDICAID

## 2025-02-27 VITALS
HEART RATE: 75 BPM | HEIGHT: 70 IN | TEMPERATURE: 99 F | RESPIRATION RATE: 18 BRPM | BODY MASS INDEX: 23.34 KG/M2 | DIASTOLIC BLOOD PRESSURE: 76 MMHG | WEIGHT: 163 LBS | OXYGEN SATURATION: 99 % | SYSTOLIC BLOOD PRESSURE: 110 MMHG

## 2025-02-27 DIAGNOSIS — R05.3 PERSISTENT COUGH: ICD-10-CM

## 2025-02-27 DIAGNOSIS — J10.1 INFLUENZA A: Primary | ICD-10-CM

## 2025-02-27 LAB
CTP QC/QA: YES
MOLECULAR STREP A: NEGATIVE
POC MOLECULAR INFLUENZA A AGN: POSITIVE
POC MOLECULAR INFLUENZA B AGN: NEGATIVE
SARS-COV-2 RDRP RESP QL NAA+PROBE: NEGATIVE

## 2025-02-27 PROCEDURE — 99284 EMERGENCY DEPT VISIT MOD MDM: CPT | Mod: 25

## 2025-02-27 PROCEDURE — 87502 INFLUENZA DNA AMP PROBE: CPT

## 2025-02-27 PROCEDURE — 96372 THER/PROPH/DIAG INJ SC/IM: CPT

## 2025-02-27 PROCEDURE — 87651 STREP A DNA AMP PROBE: CPT

## 2025-02-27 PROCEDURE — 63600175 PHARM REV CODE 636 W HCPCS: Mod: JZ,TB

## 2025-02-27 PROCEDURE — 87635 SARS-COV-2 COVID-19 AMP PRB: CPT | Performed by: STUDENT IN AN ORGANIZED HEALTH CARE EDUCATION/TRAINING PROGRAM

## 2025-02-27 RX ORDER — ACETAMINOPHEN 500 MG
500 TABLET ORAL EVERY 6 HOURS PRN
Qty: 30 TABLET | Refills: 0 | Status: SHIPPED | OUTPATIENT
Start: 2025-02-27

## 2025-02-27 RX ORDER — KETOROLAC TROMETHAMINE 30 MG/ML
30 INJECTION, SOLUTION INTRAMUSCULAR; INTRAVENOUS
Status: COMPLETED | OUTPATIENT
Start: 2025-02-27 | End: 2025-02-27

## 2025-02-27 RX ORDER — CETIRIZINE HYDROCHLORIDE 10 MG/1
10 TABLET ORAL DAILY
Qty: 30 TABLET | Refills: 0 | Status: SHIPPED | OUTPATIENT
Start: 2025-02-27 | End: 2025-03-29

## 2025-02-27 RX ORDER — IBUPROFEN 600 MG/1
600 TABLET ORAL EVERY 6 HOURS PRN
Qty: 20 TABLET | Refills: 0 | Status: SHIPPED | OUTPATIENT
Start: 2025-02-27

## 2025-02-27 RX ORDER — DEXAMETHASONE SODIUM PHOSPHATE 4 MG/ML
8 INJECTION, SOLUTION INTRA-ARTICULAR; INTRALESIONAL; INTRAMUSCULAR; INTRAVENOUS; SOFT TISSUE
Status: COMPLETED | OUTPATIENT
Start: 2025-02-27 | End: 2025-02-27

## 2025-02-27 RX ORDER — PROMETHAZINE HYDROCHLORIDE AND DEXTROMETHORPHAN HYDROBROMIDE 6.25; 15 MG/5ML; MG/5ML
5 SYRUP ORAL EVERY 4 HOURS PRN
Qty: 118 ML | Refills: 0 | Status: SHIPPED | OUTPATIENT
Start: 2025-02-27 | End: 2025-03-09

## 2025-02-27 RX ADMIN — KETOROLAC TROMETHAMINE 30 MG: 30 INJECTION, SOLUTION INTRAMUSCULAR; INTRAVENOUS at 11:02

## 2025-02-27 RX ADMIN — DEXAMETHASONE SODIUM PHOSPHATE 8 MG: 4 INJECTION INTRA-ARTICULAR; INTRALESIONAL; INTRAMUSCULAR; INTRAVENOUS; SOFT TISSUE at 11:02

## 2025-02-27 NOTE — ED PROVIDER NOTES
Encounter Date: 2/27/2025    SCRIBE #1 NOTE: I, Sunitha Stone, jennifer scribing for, and in the presence of,  Manuel Mccullough PA-C.       History     Chief Complaint   Patient presents with    Cough     Pt complaining of cough, body aches, and left side neck stiffness x 1 week unrelieved by otc meds.     Doron Douglas is a 44 y.o. male, with no pertinent PMHx, who presents to the ED with URI symptoms x1 week. Patient reports body aches, cough, sore throat, and left neck pain/stiffness due to swelling. He reports some chest wall pain 2/2 coughing only. Attempted Tx with several OTC medications including theraflu and tylenol, most recent dose of tylenol was this AM. No known sick contacts recently. No other exacerbating or alleviating factors. Denies nausea, vomiting, diarrhea, fever or other associated symptoms.       The history is provided by the patient. No  was used.     Review of patient's allergies indicates:  No Known Allergies  History reviewed. No pertinent past medical history.  History reviewed. No pertinent surgical history.  No family history on file.  Social History[1]  Review of Systems   Constitutional:  Negative for chills, diaphoresis, fatigue and fever.   HENT:  Positive for sore throat. Negative for congestion, ear discharge, ear pain, rhinorrhea and trouble swallowing.    Eyes:  Negative for photophobia, redness and visual disturbance.   Respiratory:  Positive for cough. Negative for shortness of breath.    Cardiovascular:  Negative for chest pain, palpitations and leg swelling.   Gastrointestinal:  Negative for abdominal pain, diarrhea, nausea and vomiting.   Genitourinary:  Negative for difficulty urinating, dysuria, flank pain, frequency and hematuria.   Musculoskeletal:  Positive for neck pain (left) and neck stiffness (left). Negative for arthralgias, back pain and myalgias.   Skin:  Negative for pallor and rash.   Neurological:  Negative for dizziness, weakness,  light-headedness and headaches.   Hematological:  Does not bruise/bleed easily.       Physical Exam     Initial Vitals [02/27/25 0931]   BP Pulse Resp Temp SpO2   133/79 83 18 98.3 °F (36.8 °C) 98 %      MAP       --         Physical Exam    Nursing note and vitals reviewed.  Constitutional: He appears well-developed and well-nourished. He is not diaphoretic. He does not appear ill. No distress.   HENT:   Head: Normocephalic and atraumatic.   Right Ear: Tympanic membrane, external ear and ear canal normal.   Left Ear: Tympanic membrane, external ear and ear canal normal.   Nose: Mucosal edema and rhinorrhea present. Right sinus exhibits no maxillary sinus tenderness and no frontal sinus tenderness. Left sinus exhibits no maxillary sinus tenderness and no frontal sinus tenderness. Mouth/Throat: Uvula is midline and mucous membranes are normal. No trismus in the jaw. No uvula swelling. Posterior oropharyngeal erythema present. No oropharyngeal exudate, posterior oropharyngeal edema or tonsillar abscesses.   Postnasal drip noted.  Uvula midline without any erythema or swelling.  No submandibular or sublingual swelling or erythema.  No trismus.  Normal jaw occlusion.  No evidence of tonsillar abscess.  No muffled voice.    Patient is tolerating secretions without difficulty.   Patient is speaking in full sentences on exam without difficulty.  There is no postauricular swelling, or overlying erythema or tenderness to palpation over mastoids bilaterally.   Eyes: Conjunctivae, EOM and lids are normal. Pupils are equal, round, and reactive to light. Right eye exhibits no discharge. Left eye exhibits no discharge. No scleral icterus.   Neck: Trachea normal. Neck supple. No Brudzinski's sign noted.   Full range of motion of the neck without pain or limitation.  That has no pain with the passive flexion or extension of the neck.  Negative Brudzinski.  Patient was states that has some left-sided posterior neck pain whenever he  coughs but it was not reproducible.   Normal range of motion.   Full passive range of motion without pain.     Cardiovascular:  Normal rate, regular rhythm, normal heart sounds and normal pulses.     Exam reveals no distant heart sounds and no friction rub.       Pulmonary/Chest: Effort normal and breath sounds normal. No respiratory distress. He exhibits no tenderness.   Abdominal: Abdomen is soft. Bowel sounds are normal. He exhibits no distension and no pulsatile midline mass. There is no abdominal tenderness.   No right CVA tenderness.  No left CVA tenderness. There is no rebound and no guarding.   Musculoskeletal:         General: Normal range of motion.      Right shoulder: Normal.      Left shoulder: Normal.      Right elbow: Normal.      Left elbow: Normal.      Right wrist: Normal.      Left wrist: Normal.      Right hand: Normal.      Left hand: Normal.      Cervical back: Normal, full passive range of motion without pain, normal range of motion and neck supple. No edema, erythema, rigidity, tenderness or bony tenderness. No spinous process tenderness or muscular tenderness. Normal range of motion.      Thoracic back: Normal.      Lumbar back: Normal.      Right hip: Normal.      Left hip: Normal.      Right knee: Normal.      Left knee: Normal.      Right lower leg: Normal.      Left lower leg: Normal.      Right ankle: Normal.      Left ankle: Normal.      Right foot: Normal.      Left foot: Normal.     Lymphadenopathy:        Head (right side): No submental, no submandibular, no tonsillar, no preauricular, no posterior auricular and no occipital adenopathy present.        Head (left side): No submental, no submandibular, no tonsillar, no preauricular, no posterior auricular and no occipital adenopathy present.     He has cervical adenopathy.   Neurological: He is alert and oriented to person, place, and time. He has normal strength. No cranial nerve deficit or sensory deficit. Gait normal.   Skin: Skin  is warm and dry. Capillary refill takes less than 2 seconds. No bruising, no ecchymosis and no rash noted. No erythema.   Psychiatric: He has a normal mood and affect. His speech is normal and behavior is normal. Thought content normal.         ED Course   Procedures  Labs Reviewed   POCT INFLUENZA A/B MOLECULAR - Abnormal       Result Value    POC Molecular Influenza A Ag Positive (*)     POC Molecular Influenza B Ag Negative       Acceptable Yes     SARS-COV-2 RDRP GENE    POC Rapid COVID Negative       Acceptable Yes     POCT STREP A MOLECULAR    Molecular Strep A, POC Negative       Acceptable Yes            Imaging Results              X-Ray Chest PA And Lateral (Final result)  Result time 02/27/25 11:55:02      Final result by Mathew Steele MD (02/27/25 11:55:02)                   Impression:      No acute findings.      Electronically signed by: Mathew Steele MD  Date:    02/27/2025  Time:    11:55               Narrative:    EXAMINATION:  XR CHEST PA AND LATERAL    CLINICAL HISTORY:  Chronic cough    TECHNIQUE:  PA and lateral views of the chest were performed.    COMPARISON:  None    FINDINGS:  The cardiomediastinal contour is within normal limits.  The lungs are clear.  No pneumothorax.  No pleural effusions.                                       Medications   dexAMETHasone injection 8 mg (8 mg Intramuscular Given 2/27/25 1134)   ketorolac injection 30 mg (30 mg Intramuscular Given 2/27/25 1134)     Medical Decision Making  Doron Douglas is a 44 y.o. male, with no pertinent PMHx, who presents to the ED with URI symptoms x1 week. Patient reports body aches, cough, sore throat, and left neck pain/stiffness due to swelling. He reports some chest wall pain 2/2 coughing only. Attempted Tx with several OTC medications including theraflu and tylenol, most recent dose of tylenol was this AM. No known sick contacts recently. No other exacerbating or alleviating  factors. Denies nausea, vomiting, diarrhea, fever or other associated symptoms.     Patient's chart and medical history reviewed.  Patient's vitals reviewed.  They are afebrile, no respiratory distress, nontoxic-appearing in the ED.  Patient is a well-appearing 44 y.o. male. Tolerating PO, non-toxic appearing. The patient remained comfortable and stable during their visit in the ED.  Details of ED course documented in ED workup.     Differential Diagnosis is considered, but is not limited to: COVID, Flu, Strep throat, Viral URI, PTA, RPA, pneumonia, acute otitis media, otitis externa, mastoiditis, sinusitis, viral gastroenteritis, measles, roseola.  Also considered but less likely:  PTA/RPA: no hot potato voice, no uvular deviation, no pain with passive neck flexion.  Esophageal rupture: No history of dysphagia.  Unlikely deep space infection/Armando's, no submandibular or sublingual erythema or swelling.  Low suspicion for CNS infection/meningitis: no pain with passive neck flexion, no neck rigidity/stiffness, no neck tenderness, negative Brudzinski.  Unlikely EBV as no splenomegaly, no LUQ abdominal tenderness or pain.  Patient initially complained of stiffness and swelling to the left side of the neck, on exam there was no swelling, no spasms, no stiffness, no tenderness to palpation of the musculature. No tender LAD. FROM of the neck. Patient states it only hurts when he would cough heavily when gurvinder the muscles in the area.   Influenza test Positive.    All historical, clinical, and laboratory findings reviewed. Patient with constellation of symptoms most consistent with Influenza. There are no concerning features on physical exam to suggest an emergent or life threatening condition or an invasive bacterial infection, including, but not limited to the conditions noted above. No further intervention is indicated at this time. The patient is at low risk for an emergent/life threatening medical condition at  this time, and I am of the belief that that it is safe to discharge the patient from the emergency department.     Patient/family instructed to follow up with PCP/Pediatrician in 1-2 days for recheck of today's complaints. Patient/family has been counseled regarding the need for follow-up as well as the indications to return to the emergency room should new, worsening, continued or worrisome developments. Discharge and follow-up instructions discussed with the patient/family who expressed understanding and willingness to comply with recommendations. Patient discharged from the emergency department in stable condition, in no acute distress.  I discussed with the patient/family the diagnosis, treatment plan, indications for return to the emergency department, and for expected follow-up. The patient/family verbalized an understanding. The patient/family is asked if there are any questions or concerns. We discuss the case, until all issues are addressed to the patient/family's satisfaction. Patient/family understands and is agreeable to the plan.   MANUEL PAUL PA-C    DISCLAIMER: This note was prepared with [employer] voice recognition transcription software. Garbled syntax, mangled pronouns, and other bizarre constructions may be attributed to that software system.        Amount and/or Complexity of Data Reviewed  Labs: ordered. Decision-making details documented in ED Course.  Radiology: ordered. Decision-making details documented in ED Course.    Risk  OTC drugs.  Prescription drug management.            Scribe Attestation:   Scribe #1: I performed the above scribed service and the documentation accurately describes the services I performed. I attest to the accuracy of the note.        ED Course as of 02/27/25 1350   Thu Feb 27, 2025   1027 POC Molecular Influenza A Ag(!): Positive [AF]   1234 Molecular Strep A, POC: Negative [AF]      ED Course User Index  [AF] Manuel Paul PA-C I, Alain  Aleksander Mccullough PA-C, personally performed the services described in this documentation. All medical record entries made by the scribe were at my direction and in my presence. I have reviewed the chart and agree that the record reflects my personal performance and is accurate and complete.      DISCLAIMER: This note was prepared with [employer] voice recognition transcription software. Garbled syntax, mangled pronouns, and other bizarre constructions may be attributed to that software system.      Clinical Impression:  Final diagnoses:  [R05.3] Persistent cough  [J10.1] Influenza A (Primary)          ED Disposition Condition    Discharge Stable          ED Prescriptions       Medication Sig Dispense Start Date End Date Auth. Provider    acetaminophen (TYLENOL) 500 MG tablet Take 1 tablet (500 mg total) by mouth every 6 (six) hours as needed for Pain. 30 tablet 2/27/2025 -- Manuel Mccullough PA-C    ibuprofen (ADVIL,MOTRIN) 600 MG tablet Take 1 tablet (600 mg total) by mouth every 6 (six) hours as needed for Pain. 20 tablet 2/27/2025 -- Manuel Mccullough PA-C    cetirizine (ZYRTEC) 10 MG tablet Take 1 tablet (10 mg total) by mouth once daily. 30 tablet 2/27/2025 3/29/2025 Manuel Mccullough PA-C    promethazine-dextromethorphan (PROMETHAZINE-DM) 6.25-15 mg/5 mL Syrp Take 5 mLs by mouth every 4 (four) hours as needed. 118 mL 2/27/2025 3/9/2025 Manuel Mccullough PA-C          Follow-up Information       Follow up With Specialties Details Why Contact Info    [facility] -  Schedule an appointment as soon as possible for a visit in 1 day for follow up if you do not currently have a  [address] [place] [id number] [phone]      Your primary care physician  Schedule an appointment as soon as possible for a visit in 1 day for follow up regarding today's visit and for re-evaluation.     [facility] Emergency Dept Emergency Medicine Go to  If you have new or worsening symptoms, or if you have any concerns at  all. [address]  [facility] [place] [id number] [phone]                 [1]   Social History  Tobacco Use    Smoking status: Every Day     Current packs/day: 0.50     Types: Cigarettes   Substance Use Topics    Alcohol use: Yes     Comment: occassional    Drug use: No        Manuel Mccullough PA-C  02/27/25 5396

## 2025-02-27 NOTE — DISCHARGE INSTRUCTIONS
Thank you for coming to our Emergency Department today. It is important to remember that some problems or medical conditions are difficult to diagnose and may not be found or addressed during your Emergency Department visit.  These conditions often start with non-specific symptoms and can only be diagnosed on follow up visits with your primary care physician or specialist when the symptoms continue or change. Please remember that all medical conditions can change, and we cannot predict how you will be feeling tomorrow or the next day. Return to the ER with any questions/concerns, new/concerning symptoms including fever, chest pain, shortness of breath, loss of consciousness, dizziness, weakness, worsening symptoms, failure to improve, or any other concerns. Also, please follow up with your Primary Care Physician and/or Pediatrician in the next 1-2 days to review your ED visit in entirety and for re-evaluation.   Be sure to follow up with your primary care doctor and review all labs/imaging/tests that were performed during your ER visit with them. It is very common for us to identify non-emergent incidental findings which must be followed up with your primary care physician.  Some labs/imaging/tests may be outside of the normal range, and require non-emergent follow-up and/or further investigation/treatment/procedures/testing to help diagnose/exclude/prevent complications or other potentially serious medical conditions. Some abnormalities may not have been discussed or addressed during your ER visit. Some lab results may not return during your ER visit but can be accessible by downloading the free [facility] My[facility] eloy or by visiting [email or web address] . It is important for you to review all labs/imaging/tests which are outside of the normal range with your physician.  An ER visit does not replace a primary care visit, and many screening tests or follow-up tests cannot be ordered by an ER doctor or performed by  the ER. Some tests may even require pre-approval.  If you do not have a primary care doctor, you may contact the one listed on your discharge paperwork or you may also call the [facility] Appointment Desk at [phone] , or [address] [place] [address] at  [phone] to schedule an appointment, or establish care with a primary care doctor or even a specialist and to obtain information about local resources. It is important to your health that you have a primary care doctor.  Please take all medications as directed. We have done our best to select a medication for you that will treat your condition however, all medications may potentially have side-effects and it is impossible to predict which medications may give you side-effects or what those side-effects (if any) those medications may give you.  If you feel that you are having a negative effect or side-effect of any medication you should stop taking those medications immediately and seek medical attention. If you feel that you are having a life-threatening reaction call 911.  Do not drive, swim, climb to height, take a bath, operate heavy machinery, drink alcohol or take potentially sedating medications, sign any legal documents or make any important decisions for 24 hours if you have received any pain medications, sedatives or mood altering drugs during your ER visit or within 24 hours of taking them if they have been prescribed to you.   You can find additional resources for Dentists, hearing aids, durable medical equipment, low cost pharmacies and other resources at [email or web address]  Patient agrees with this plan. Discussed with her strict return precautions, they verbalized understanding. Patient is stable for discharge.   § Please take all medication as prescribed.

## 2025-02-27 NOTE — ED TRIAGE NOTES
"Pt presents to the ED with complaints of cough, body aches, and muscle stiffness x 1 week. Pt reports taking Ibuprofen with minimal to no relief. Pt reports "it hurts when I move my neck". Pt denies other symptoms at this time.   "

## 2025-02-27 NOTE — Clinical Note
"Doron "Tai" Misael was seen and treated in our emergency department on 2/27/2025.  He may return to work on 03/03/2025.       If you have any questions or concerns, please don't hesitate to call.      Manuel Mccullough PA-C"

## 2025-03-16 ENCOUNTER — HOSPITAL ENCOUNTER (EMERGENCY)
Facility: HOSPITAL | Age: 45
Discharge: HOME | End: 2025-03-16
Attending: STUDENT IN AN ORGANIZED HEALTH CARE EDUCATION/TRAINING PROGRAM
Payer: MEDICAID

## 2025-03-16 VITALS
BODY MASS INDEX: 22.4 KG/M2 | SYSTOLIC BLOOD PRESSURE: 106 MMHG | WEIGHT: 160 LBS | DIASTOLIC BLOOD PRESSURE: 56 MMHG | HEART RATE: 96 BPM | OXYGEN SATURATION: 98 % | TEMPERATURE: 98 F | RESPIRATION RATE: 18 BRPM | HEIGHT: 71 IN

## 2025-03-16 DIAGNOSIS — M25.512 CHRONIC LEFT SHOULDER PAIN: Primary | ICD-10-CM

## 2025-03-16 DIAGNOSIS — G89.29 CHRONIC LEFT SHOULDER PAIN: Primary | ICD-10-CM

## 2025-03-16 DIAGNOSIS — K02.9 DENTAL CARIES: ICD-10-CM

## 2025-03-16 PROCEDURE — 99284 EMERGENCY DEPT VISIT MOD MDM: CPT | Mod: 25

## 2025-03-16 PROCEDURE — 25000003 PHARM REV CODE 250

## 2025-03-16 PROCEDURE — 96372 THER/PROPH/DIAG INJ SC/IM: CPT

## 2025-03-16 PROCEDURE — 63600175 PHARM REV CODE 636 W HCPCS: Mod: JZ,TB

## 2025-03-16 RX ORDER — ACETAMINOPHEN 500 MG
500 TABLET ORAL EVERY 6 HOURS PRN
Qty: 28 TABLET | Refills: 0 | Status: SHIPPED | OUTPATIENT
Start: 2025-03-16 | End: 2025-03-23

## 2025-03-16 RX ORDER — AMOXICILLIN 875 MG/1
875 TABLET, FILM COATED ORAL 2 TIMES DAILY
Qty: 14 TABLET | Refills: 1 | Status: SHIPPED | OUTPATIENT
Start: 2025-03-16

## 2025-03-16 RX ORDER — METHOCARBAMOL 500 MG/1
1000 TABLET, FILM COATED ORAL 3 TIMES DAILY
Qty: 30 TABLET | Refills: 0 | Status: SHIPPED | OUTPATIENT
Start: 2025-03-16 | End: 2025-03-21

## 2025-03-16 RX ORDER — NAPROXEN 500 MG/1
500 TABLET ORAL 2 TIMES DAILY WITH MEALS
Qty: 14 TABLET | Refills: 0 | Status: SHIPPED | OUTPATIENT
Start: 2025-03-16 | End: 2025-03-23

## 2025-03-16 RX ORDER — KETOROLAC TROMETHAMINE 30 MG/ML
30 INJECTION, SOLUTION INTRAMUSCULAR; INTRAVENOUS
Status: COMPLETED | OUTPATIENT
Start: 2025-03-16 | End: 2025-03-16

## 2025-03-16 RX ORDER — AMOXICILLIN 250 MG/1
1000 CAPSULE ORAL
Status: COMPLETED | OUTPATIENT
Start: 2025-03-16 | End: 2025-03-16

## 2025-03-16 RX ADMIN — AMOXICILLIN 1000 MG: 250 CAPSULE ORAL at 12:03

## 2025-03-16 RX ADMIN — KETOROLAC TROMETHAMINE 30 MG: 30 INJECTION, SOLUTION INTRAMUSCULAR; INTRAVENOUS at 12:03

## 2025-03-16 NOTE — ED PROVIDER NOTES
Encounter Date: 3/16/2025    SCRIBE #1 NOTE: Shelia CALZADA, jennifer scribing for, and in the presence of,  Sukh Brown PA-C. I have scribed the following portions of the note - Other sections scribed: HPI, ROS.       History     Chief Complaint   Patient presents with    Shoulder Pain     Pt to ED c/o left shoulder pain and left lower intermittent swelling from infected tooth x's 1 month.     Dental Pain     45-year-old male with no known past medical history who presents to the ED for evaluation of two separate complaints of left shoulder pain and dental pain. Reports associated left facial pain and swelling dental pain d/t a cracked tooth to the bottom left. He reports he was evaluated by dentistry, given medication, but they were not able to extract the tooth. Patient also reports chronic left shoulder pain ongoing for the last 8.5 years, normally takes Tylenol for it, but the pain has been exacerbated by physical work lately. Denies any new trauma or injuries.  Was previously recommended to have surgery, was not ever carried out.  Patient is requesting a referral to see orthopedic surgery to address his shoulder pain. Denies fever, headache, appetite change, difficulty swallowing, chills, neck pain, abdominal pain, nausea, vomiting, diarrhea, or other associated symptoms.     X-rays obtained d/t pain 05/06/2024 of left shoulder, left clavicle without any acute fractures.     The history is provided by the patient. No  was used.     Review of patient's allergies indicates:  No Known Allergies  History reviewed. No pertinent past medical history.  History reviewed. No pertinent surgical history.  No family history on file.  Social History[1]  Review of Systems   Constitutional:  Negative for appetite change, chills, fever and unexpected weight change.   HENT:  Positive for dental problem (L, bottom left cracked teeth) and facial swelling (L). Negative for sinus pain, sore throat and  trouble swallowing.    Eyes:  Negative for pain, redness and visual disturbance.   Respiratory:  Negative for cough, chest tightness, shortness of breath and wheezing.    Cardiovascular:  Negative for chest pain and palpitations.   Gastrointestinal:  Negative for abdominal pain, blood in stool, diarrhea, nausea and vomiting.   Endocrine: Negative for polydipsia, polyphagia and polyuria.   Genitourinary:  Negative for dysuria, frequency and urgency.   Musculoskeletal:  Positive for arthralgias (L shoulder). Negative for back pain, myalgias, neck pain and neck stiffness.   Skin:  Negative for rash.   Allergic/Immunologic: Negative for environmental allergies.   Neurological:  Negative for dizziness, syncope and headaches.   Psychiatric/Behavioral:  Negative for suicidal ideas.        Physical Exam     Initial Vitals [03/16/25 1200]   BP Pulse Resp Temp SpO2   (!) 106/56 96 18 98.4 °F (36.9 °C) 98 %      MAP       --         Physical Exam    Nursing note and vitals reviewed.  Constitutional: Vital signs are normal. He appears well-developed and well-nourished. He is cooperative. He does not appear ill. No distress.   Well-appearing.  No acute distress.   HENT:   Head: Normocephalic and atraumatic.   Right Ear: External ear normal.   Left Ear: External ear normal.   Nose: Nose normal. Mouth/Throat: Uvula is midline, oropharynx is clear and moist and mucous membranes are normal. Mucous membranes are not dry. No trismus in the jaw. Abnormal dentition. Dental caries present. No dental abscesses.   Mild facial swelling about the left mandible.  Overall poor dentition with multiple cracked and missing teeth.  It is of plaque.  Two teeth in the left lower jaw are tender to percussion and palpation.  No surrounding gingival erythema or edema.  No drainable dental abscess.   Eyes: Conjunctivae and EOM are normal.   Neck: Phonation normal.   Normal range of motion.  Cardiovascular:  Normal rate and regular rhythm.           No  murmur heard.  Pulmonary/Chest: Effort normal. No respiratory distress.   Abdominal: Abdomen is flat. He exhibits no distension. There is no abdominal tenderness.   Musculoskeletal:      Cervical back: Normal range of motion.      Comments: Full range of motion of the left shoulder.  No tenderness to palpation.  Neurovascularly intact distal.     Neurological: He is alert and oriented to person, place, and time. GCS eye subscore is 4. GCS verbal subscore is 5. GCS motor subscore is 6.   Skin: Skin is warm and dry. Capillary refill takes less than 2 seconds. No rash noted.         ED Course   Procedures  Labs Reviewed - No data to display       Imaging Results    None          Medications   ketorolac injection 30 mg (30 mg Intramuscular Given 3/16/25 1242)   amoxicillin capsule 1,000 mg (1,000 mg Oral Given 3/16/25 1242)     Medical Decision Making  45-year-old male presenting to the emergency department for evaluation of dental pain with associated facial swelling as well as chronic left shoulder pain.  On exam, well-appearing and in no acute distress.  Vitals within acceptable ranges.  Mild left-sided facial swelling, poor dentition, and 2 teeth in the left lower jaw that are tender to percussion and palpation.  Benign examination of the left shoulder.  Neurovascularly intact distal.      Differential diagnosis includes but is not limited to dental abscess, fracture, pulpitis, gingivitis, dental fracture, dental avulsion, shoulder impingement syndrome, rotator cuff arthropathy, contusion, dislocation, fracture.    Presentation is consistent with dental infection, chronic left shoulder pain.  I will start the patient on amoxicillin for dental infection.  Toradol for pain today.  Anti-inflammatories and muscle relaxers electronically prescribed and sent to the patient's preferred pharmacy.  Referral placed for orthopedic follow up.  Stable for discharge home to outpatient follow up.    Return precautions were  discussed, all patient questions were answered, and the patient was agreeable to the plan of care.  He was discharged home in stable condition and will follow up with his primary care provider or return to the emergency department if his symptoms worsen or do not improve.     Amount and/or Complexity of Data Reviewed  External Data Reviewed: radiology.     Details: External documents reviewed: X-ray L shoulder, L clavicle 05/06/2024.       Risk  OTC drugs.  Prescription drug management.            Scribe Attestation:   Scribe #1: I performed the above scribed service and the documentation accurately describes the services I performed. I attest to the accuracy of the note.                               Clinical Impression:  Final diagnoses:  [M25.512, G89.29] Chronic left shoulder pain (Primary)  [K02.9] Dental caries          ED Disposition Condition    Discharge Stable          ED Prescriptions       Medication Sig Dispense Start Date End Date Auth. Provider    amoxicillin (AMOXIL) 875 MG tablet Take 1 tablet (875 mg total) by mouth 2 (two) times daily. 14 tablet 3/16/2025 -- Sukh Brown PA-C    acetaminophen (TYLENOL) 500 MG tablet Take 1 tablet (500 mg total) by mouth every 6 (six) hours as needed. 28 tablet 3/16/2025 3/23/2025 Sukh Brown, MAYLIN    naproxen (NAPROSYN) 500 MG tablet Take 1 tablet (500 mg total) by mouth 2 (two) times daily with meals. for 7 days 14 tablet 3/16/2025 3/23/2025 Sukh Brown, MAYLIN    methocarbamoL (ROBAXIN) 500 MG Tab Take 2 tablets (1,000 mg total) by mouth 3 (three) times daily. for 5 days 30 tablet 3/16/2025 3/21/2025 Sukh Brown, MAYLIN          Follow-up Information       Follow up With Specialties Details Why Contact Info    [facility] -  Schedule an appointment as soon as possible for a visit  As needed, If symptoms worsen [address] [place] [address]  [phone]      Your preferred dentist  Schedule an appointment as soon as  possible for a visit             I, Sukh Brown PA-C, personally performed the services described in this documentation. All medical record entries made by the scribe were at my direction and in my presence. I have reviewed the chart and agree that the record reflects my personal performance and is accurate and complete.         [1]   Social History  Tobacco Use    Smoking status: Every Day     Current packs/day: 0.50     Types: Cigarettes   Substance Use Topics    Alcohol use: Yes     Comment: occassional    Drug use: No        Sukh Brown PA-C  03/16/25 1300

## 2025-03-16 NOTE — DISCHARGE INSTRUCTIONS

## 2025-05-06 ENCOUNTER — PATIENT OUTREACH (OUTPATIENT)
Dept: ADMINISTRATIVE | Facility: OTHER | Age: 45
End: 2025-05-06
Payer: MEDICAID

## 2025-05-06 NOTE — PROGRESS NOTES
[place] - Outreach     Called pt has no PCP to establish care with [facility].  General VM left on unidentified VM.  Left clinic contact information and encouraged to return call and schedule appt.

## 2025-06-02 ENCOUNTER — HOSPITAL ENCOUNTER (EMERGENCY)
Facility: HOSPITAL | Age: 45
Discharge: HOME | End: 2025-06-02
Attending: EMERGENCY MEDICINE
Payer: MEDICAID

## 2025-06-02 VITALS
RESPIRATION RATE: 17 BRPM | OXYGEN SATURATION: 100 % | TEMPERATURE: 98 F | HEIGHT: 71 IN | BODY MASS INDEX: 22.4 KG/M2 | SYSTOLIC BLOOD PRESSURE: 121 MMHG | DIASTOLIC BLOOD PRESSURE: 76 MMHG | WEIGHT: 160 LBS | HEART RATE: 71 BPM

## 2025-06-02 DIAGNOSIS — Z71.1 CONCERN ABOUT SEXUALLY TRANSMITTED DISEASE IN MALE WITHOUT DIAGNOSIS: ICD-10-CM

## 2025-06-02 DIAGNOSIS — K08.89 PAIN, DENTAL: Primary | ICD-10-CM

## 2025-06-02 PROCEDURE — 87591 N.GONORRHOEAE DNA AMP PROB: CPT | Performed by: EMERGENCY MEDICINE

## 2025-06-02 PROCEDURE — 99284 EMERGENCY DEPT VISIT MOD MDM: CPT | Mod: ER

## 2025-06-02 PROCEDURE — 25000003 PHARM REV CODE 250: Mod: ER | Performed by: EMERGENCY MEDICINE

## 2025-06-02 RX ORDER — LIDOCAINE HYDROCHLORIDE 20 MG/ML
10 SOLUTION OROPHARYNGEAL
Status: COMPLETED | OUTPATIENT
Start: 2025-06-02 | End: 2025-06-02

## 2025-06-02 RX ORDER — DICLOFENAC SODIUM 75 MG/1
75 TABLET, DELAYED RELEASE ORAL 2 TIMES DAILY
Qty: 60 TABLET | Refills: 0 | Status: SHIPPED | OUTPATIENT
Start: 2025-06-02

## 2025-06-02 RX ORDER — AMOXICILLIN AND CLAVULANATE POTASSIUM 875; 125 MG/1; MG/1
1 TABLET, FILM COATED ORAL
Status: COMPLETED | OUTPATIENT
Start: 2025-06-02 | End: 2025-06-02

## 2025-06-02 RX ORDER — KETOROLAC TROMETHAMINE 10 MG/1
10 TABLET, FILM COATED ORAL
Status: COMPLETED | OUTPATIENT
Start: 2025-06-02 | End: 2025-06-02

## 2025-06-02 RX ORDER — AMOXICILLIN 500 MG/1
500 CAPSULE ORAL 3 TIMES DAILY
Qty: 21 CAPSULE | Refills: 0 | Status: SHIPPED | OUTPATIENT
Start: 2025-06-02 | End: 2025-06-09

## 2025-06-02 RX ADMIN — AMOXICILLIN AND CLAVULANATE POTASSIUM 1 TABLET: 875; 125 TABLET, FILM COATED ORAL at 06:06

## 2025-06-02 RX ADMIN — KETOROLAC TROMETHAMINE 10 MG: 10 TABLET, FILM COATED ORAL at 06:06

## 2025-06-02 RX ADMIN — LIDOCAINE HYDROCHLORIDE 10 ML: 20 SOLUTION ORAL at 06:06

## 2025-06-03 LAB
C TRACH DNA SPEC QL NAA+PROBE: NOT DETECTED
CTGC SOURCE (OHS) ORD-325: NORMAL
N GONORRHOEA DNA UR QL NAA+PROBE: NOT DETECTED

## 2025-06-21 ENCOUNTER — HOSPITAL ENCOUNTER (EMERGENCY)
Facility: HOSPITAL | Age: 45
Discharge: HOME | End: 2025-06-21
Attending: EMERGENCY MEDICINE
Payer: MEDICAID

## 2025-06-21 VITALS
BODY MASS INDEX: 21 KG/M2 | WEIGHT: 150 LBS | TEMPERATURE: 99 F | OXYGEN SATURATION: 98 % | HEIGHT: 71 IN | RESPIRATION RATE: 18 BRPM | SYSTOLIC BLOOD PRESSURE: 134 MMHG | HEART RATE: 64 BPM | DIASTOLIC BLOOD PRESSURE: 89 MMHG

## 2025-06-21 DIAGNOSIS — M54.50 THORACOLUMBAR BACK PAIN: ICD-10-CM

## 2025-06-21 DIAGNOSIS — Z71.1 CONCERN ABOUT STD IN MALE WITHOUT DIAGNOSIS: ICD-10-CM

## 2025-06-21 DIAGNOSIS — K04.7 DENTAL ABSCESS: Primary | ICD-10-CM

## 2025-06-21 DIAGNOSIS — M54.6 THORACOLUMBAR BACK PAIN: ICD-10-CM

## 2025-06-21 PROCEDURE — 25000003 PHARM REV CODE 250: Mod: ER | Performed by: PHYSICIAN ASSISTANT

## 2025-06-21 PROCEDURE — 87591 N.GONORRHOEAE DNA AMP PROB: CPT | Performed by: PHYSICIAN ASSISTANT

## 2025-06-21 PROCEDURE — 99284 EMERGENCY DEPT VISIT MOD MDM: CPT | Mod: ER

## 2025-06-21 PROCEDURE — 87661 TRICHOMONAS VAGINALIS AMPLIF: CPT | Performed by: PHYSICIAN ASSISTANT

## 2025-06-21 RX ORDER — IBUPROFEN 600 MG/1
600 TABLET, FILM COATED ORAL
Status: COMPLETED | OUTPATIENT
Start: 2025-06-21 | End: 2025-06-21

## 2025-06-21 RX ORDER — IBUPROFEN 600 MG/1
600 TABLET, FILM COATED ORAL EVERY 6 HOURS PRN
Qty: 20 TABLET | Refills: 0 | Status: SHIPPED | OUTPATIENT
Start: 2025-06-21 | End: 2025-06-26

## 2025-06-21 RX ORDER — CYCLOBENZAPRINE HCL 10 MG
10 TABLET ORAL 3 TIMES DAILY PRN
Qty: 20 TABLET | Refills: 0 | Status: SHIPPED | OUTPATIENT
Start: 2025-06-21 | End: 2025-06-28

## 2025-06-21 RX ORDER — AMOXICILLIN AND CLAVULANATE POTASSIUM 875; 125 MG/1; MG/1
1 TABLET, FILM COATED ORAL
Status: COMPLETED | OUTPATIENT
Start: 2025-06-21 | End: 2025-06-21

## 2025-06-21 RX ORDER — AMOXICILLIN AND CLAVULANATE POTASSIUM 875; 125 MG/1; MG/1
1 TABLET, FILM COATED ORAL 2 TIMES DAILY
Qty: 14 TABLET | Refills: 0 | Status: SHIPPED | OUTPATIENT
Start: 2025-06-21

## 2025-06-21 RX ADMIN — AMOXICILLIN AND CLAVULANATE POTASSIUM 1 TABLET: 875; 125 TABLET, FILM COATED ORAL at 12:06

## 2025-06-21 RX ADMIN — IBUPROFEN 600 MG: 600 TABLET ORAL at 12:06

## 2025-06-21 NOTE — ED PROVIDER NOTES
"Encounter Date: 6/21/2025    SCRIBE #1 NOTE: I Cristina Li, am scribing for, and in the presence of,  Geraldine Flores PA-C.       History     Chief Complaint   Patient presents with    Dental Pain     C/O LEFT SIDED DENTAL PAIN X 2 WEEKS     CC: dental pain, STI concerns     HPI: 44 yo M with no PMHx presenting to the ED for L lower dental pain for 2 weeks. He states a tooth to the area "is cracked". Pt was given a course of amoxicillin 2 weeks ago for this concern.    He also requests STD testing today as his s/o is experiencing +vaginal discharge and discomfort. Notes unprotected sexual intercourse. He was last tested 2 weeks ago (where he was asymptomatic per chart review) which resulted negative. He currently denies any dysuria, hematuria, urgency, frequency, lesions, penile discharge, or other associated symptoms.     The history is provided by the patient and medical records.     Review of patient's allergies indicates:  No Known Allergies  History reviewed. No pertinent past medical history.  History reviewed. No pertinent surgical history.  No family history on file.  Social History[1]  Review of Systems   Constitutional:  Negative for chills and fever.   HENT:  Positive for dental problem. Negative for congestion, rhinorrhea and sore throat.    Eyes:  Negative for visual disturbance.   Respiratory:  Negative for cough and shortness of breath.    Cardiovascular:  Negative for chest pain.   Gastrointestinal:  Negative for abdominal pain, diarrhea, nausea and vomiting.   Genitourinary:  Negative for dysuria, frequency, hematuria, penile discharge and penile pain.   Musculoskeletal:  Negative for back pain.   Skin:  Negative for rash.   Neurological:  Negative for dizziness, weakness and headaches.       Physical Exam     Initial Vitals [06/21/25 1159]   BP Pulse Resp Temp SpO2   134/89 64 18 98.6 °F (37 °C) 98 %      MAP       --         Physical Exam    Nursing note and vitals reviewed.  Constitutional: He " appears well-developed and well-nourished.  Non-toxic appearance. He does not have a sickly appearance. No distress.   HENT:   Head: Normocephalic.   Right Ear: External ear normal.   Left Ear: External ear normal. Mouth/Throat: Uvula is midline and mucous membranes are normal. No trismus in the jaw. No oropharyngeal exudate, posterior oropharyngeal edema or posterior oropharyngeal erythema.       Dental caries throughout   No protrusion of tongue or ttp of mouth floor  Tolerating secretions     Eyes: Conjunctivae and EOM are normal.   Neck: No tracheal deviation present.   Normal range of motion.  Cardiovascular:  Normal rate and regular rhythm.     Exam reveals no gallop and no friction rub.       No murmur heard.  Pulses:       Dorsalis pedis pulses are 2+ on the right side and 2+ on the left side.   Pulmonary/Chest: Breath sounds normal. No tachypnea and no bradypnea. No respiratory distress. He has no wheezes. He has no rhonchi. He has no rales.   Abdominal: Abdomen is soft. There is no abdominal tenderness.   Musculoskeletal:         General: Normal range of motion.      Cervical back: Normal range of motion. Muscular tenderness present. No spinous process tenderness.      Comments: TTP over thoracic paraspinal musculature    Normal strength to bilateral lower extremities       Neurological: He is alert and oriented to person, place, and time. GCS eye subscore is 4. GCS verbal subscore is 5. GCS motor subscore is 6.   Skin: Skin is warm and dry. No rash noted.   Psychiatric: He has a normal mood and affect. His behavior is normal. Judgment and thought content normal.         ED Course   Procedures  Labs Reviewed   TRICHOMONAS VAGINALIS, RNA,QUAL   C. TRACHOMATIS/N. GONORRHOEAE BY AMP DNA          Imaging Results    None          Medications   ibuprofen tablet 600 mg (600 mg Oral Given 6/21/25 1215)   amoxicillin-clavulanate 875-125mg per tablet 1 tablet (1 tablet Oral Given 6/21/25 1215)     Medical Decision  Making  45-year-old male presenting for multiple complaints.  Complaining of left lower dental pain.  Exam consistent with abscess.  Offered incision and drainage however patient declined.  He is opting to take antibiotics and apply warm compresses to the area.  No evidence of Armando's angina, deep neck space infection or airway compromise.  Additionally complaining of chronic thoracolumbar back pain.  Denies any falls or trauma.  No midline tenderness.  Normal strength bilateral lower extremities.  No neurovascular deficits.  Likely spasm.  Will discharge with Flexeril for muscle spasms.  Considered but doubt cauda equina epidural abscess or cord compression.  Additionally requesting STD testing as his sexual partner is having vaginal discharge.  GC and Trichomonas from the urine ordered. PCP Follow up and dental follow up. Return to ER for worsening or as needed    Amount and/or Complexity of Data Reviewed  Labs: ordered. Decision-making details documented in ED Course.    Risk  Prescription drug management.            Scribe Attestation:   Scribe #1: I performed the above scribed service and the documentation accurately describes the services I performed. I attest to the accuracy of the note.                           I, Geraldine Flores PA-C , personally performed the services described in this documentation. All medical record entries made by the scribe were at my direction and in my presence. I have reviewed the chart and agree that the record reflects my personal performance and is accurate and complete.      DISCLAIMER: This note was prepared with [employer] voice recognition transcription software. Garbled syntax, mangled pronouns, and other bizarre constructions may be attributed to that software system.      Clinical Impression:  Final diagnoses:  [K04.7] Dental abscess (Primary)  [M54.50, M54.6] Thoracolumbar back pain  [Z71.1] Concern about STD in male without diagnosis          ED Disposition Condition     Discharge Stable          ED Prescriptions       Medication Sig Dispense Start Date End Date Auth. Provider    ibuprofen (ADVIL,MOTRIN) 600 MG tablet Take 1 tablet (600 mg total) by mouth every 6 (six) hours as needed. 20 tablet 6/21/2025 6/26/2025 Geraldine Flores PA-C    amoxicillin-clavulanate 875-125mg (AUGMENTIN) 875-125 mg per tablet Take 1 tablet by mouth 2 (two) times daily. 14 tablet 6/21/2025 -- Geraldine Flores PA-C    cyclobenzaprine (FLEXERIL) 10 MG tablet Take 1 tablet (10 mg total) by mouth 3 (three) times daily as needed. 20 tablet 6/21/2025 6/28/2025 Geraldine Flores PA-C          Follow-up Information       Follow up With Specialties Details Why Contact Info    Your Primary Care Doctor  Schedule an appointment as soon as possible for a visit in 2 days      [facility]  Schedule an appointment as soon as possible for a visit in 1 day      [facility]    M-Th Arrive at 8 AM no later    Approximately $75-$195 per tooth extraction (cash/credit card)   [phone]    [address] [place] [address] [id number] [place] [address] ED Emergency Medicine Go to  As needed, If symptoms worsen [address] [facility] [address] [phone]                 Geraldine Flores PA-C  06/21/25 1306         [1]   Social History  Tobacco Use    Smoking status: Every Day     Current packs/day: 0.50     Types: Cigarettes   Substance Use Topics    Alcohol use: Yes     Comment: occassional    Drug use: No        Geraldine Flores PA-C  06/21/25 2742

## 2025-06-21 NOTE — DISCHARGE INSTRUCTIONS

## 2025-06-21 NOTE — Clinical Note
"Doron Montalvomathew Douglas was seen and treated in our emergency department on 6/21/2025.  He may return to work on 06/22/2025.       If you have any questions or concerns, please don't hesitate to call.      Geraldine Flores PA-C"

## 2025-06-21 NOTE — ED NOTES
Doron Dogulas, a 45 y.o. male presents to the ED w/ complaint of (left) bottom tooth pain x 2 weeks.   Pt report concerns of possible STI exposure from significant other.   Denies penile discharge and symptoms.       Chief Complaint   Patient presents with    Dental Pain     C/O LEFT SIDED DENTAL PAIN X 2 WEEKS     Review of patient's allergies indicates:  No Known Allergies  History reviewed. No pertinent past medical history.

## 2025-06-24 LAB
C TRACH DNA SPEC QL NAA+PROBE: NOT DETECTED
CTGC SOURCE (OHS) ORD-325: NORMAL
N GONORRHOEA DNA UR QL NAA+PROBE: NOT DETECTED
SPECIMEN SOURCE: NORMAL
T VAGINALIS RRNA SPEC QL NAA+PROBE: NEGATIVE